# Patient Record
(demographics unavailable — no encounter records)

---

## 2024-12-10 NOTE — ASSESSMENT
[FreeTextEntry1] : Hypertension= blood pressure in good range Continue losartan HCTZ  Watch salt in diet Exercise/weight control  Hyperlipidemia Lipid profile sent today with LFTs Continue daily rosuvastatin Low-fat diet Exercise/weight control  Hypothyroidism On daily Synthroid Check TFTs today  Low vitamin B12 level On daily oral supplementation Repeat level today  Complains of clogged sensation in right ear Exam normal Suspect ETD Flonase and antihistamine as needed ENT follow-up  She will return in follow-up in 6 months for her annual physical and as needed She will call if her status worsens/changes or for any medical issues and return to be seen immediately All of the above was discussed in detail with the patient and all of her questions were addressed and answered She verbally confirmed understanding of all of the above and agreement with the above plan Labs sent today

## 2024-12-10 NOTE — PHYSICAL EXAM
[No Acute Distress] : no acute distress [Well Nourished] : well nourished [Well Developed] : well developed [Well-Appearing] : well-appearing [Normal Voice/Communication] : normal voice/communication [Normal Sclera/Conjunctiva] : normal sclera/conjunctiva [PERRL] : pupils equal round and reactive to light [EOMI] : extraocular movements intact [Normal Outer Ear/Nose] : the outer ears and nose were normal in appearance [Normal Oropharynx] : the oropharynx was normal [Normal TMs] : both tympanic membranes were normal [No JVD] : no jugular venous distention [Supple] : supple [No Respiratory Distress] : no respiratory distress  [No Accessory Muscle Use] : no accessory muscle use [Clear to Auscultation] : lungs were clear to auscultation bilaterally [Normal Rate] : normal rate  [Regular Rhythm] : with a regular rhythm [Normal S1, S2] : normal S1 and S2 [No Edema] : there was no peripheral edema [No Extremity Clubbing/Cyanosis] : no extremity clubbing/cyanosis [Declined Breast Exam] : declined breast exam  [Soft] : abdomen soft [Normal Bowel Sounds] : normal bowel sounds [Declined Rectal Exam] : declined rectal exam [No CVA Tenderness] : no CVA  tenderness [No Spinal Tenderness] : no spinal tenderness [Grossly Normal Strength/Tone] : grossly normal strength/tone [No Rash] : no rash [No Focal Deficits] : no focal deficits [Normal Gait] : normal gait [Speech Grossly Normal] : speech grossly normal [Normal Affect] : the affect was normal [Alert and Oriented x3] : oriented to person, place, and time [de-identified] : No sinus tenderness [de-identified] : No stridor [de-identified] : R = 16 [de-identified] : Normal bilateral radial pulses; no cords [de-identified] : Declined

## 2024-12-10 NOTE — REVIEW OF SYSTEMS
[Vision Problems] : vision problems [Earache] : earache [Nasal Discharge] : nasal discharge [Heartburn] : heartburn [Incontinence] : incontinence [Dizziness] : dizziness [Anxiety] : anxiety [Negative] : Heme/Lymph

## 2024-12-10 NOTE — HEALTH RISK ASSESSMENT
[No] : In the past 12 months have you used drugs other than those required for medical reasons? No [No falls in past year] : Patient reported no falls in the past year [Patient refused screening] : Patient refused screening [Never] : Never [de-identified] : Exercises daily [de-identified] : Regular

## 2024-12-10 NOTE — HISTORY OF PRESENT ILLNESS
[FreeTextEntry8] : Comes in today for routine 6-month follow-up office visit. Feeling well. Only complaint is that occasionally her right ear feels clogged. No other complaints today. Wants labs done.

## 2024-12-30 NOTE — HISTORY OF PRESENT ILLNESS
[FreeTextEntry8] : Ms. GOMEZ is a 73 year old female who presents to the office for an acute visit due to a complaint of cold like symptoms: Symptoms began Saturday - started with cough - non productive Then symptoms moved into sinuses - has severe congestion, rhinorrhea, sinus pressure and pain Associated with subjective chills but no fever  Has hx of eustachian tube dysfunction Ears feel normal - no pain but some pressure No obvious sick contacts  Taking Mucinex, Afrin  Allergy to penicillin  Start Azelastine for nasal congestion and hx of ET dysfunction Start Cefuroxime as prescribed

## 2025-01-13 NOTE — HISTORY OF PRESENT ILLNESS
[FreeTextEntry8] : Comes in for acute medical visit. Reports that she recently tested positive for COVID.  She was also treated with a course of low-dose cefuroxime for sinusitis with improvement in her symptoms. Over the weekend developed dysuria and hematuria.  Went to urgent care.  Very upset that she went to urgent care as her prior PCP had advised her to never go to urgent care for treatment.  Told that she had a UTI and started on Macrobid 100 mg twice daily for 5 days. No longer has hematuria.  Denies any fevers or chills.  Denies any nausea or vomiting.  Denies any flank pain or abdominal pain.  Dysuria has abated. Now has new upper respiratory process.  She is extremely anxious that her sinusitis has recurred.  Denies any sore throat.  Denies any ear pain.  Had significant rhinitis.  This morning had scant yellowish discharge from 1 nostril.  Has postnasal drip.  Denies any fevers or chills.  Denies any chest pain or shortness of breath or cough.  Denies any hemoptysis.  Denies any myalgias.

## 2025-01-13 NOTE — PHYSICAL EXAM
[No Acute Distress] : no acute distress [Well Nourished] : well nourished [Well Developed] : well developed [Well-Appearing] : well-appearing [Normal Voice/Communication] : normal voice/communication [Normal Sclera/Conjunctiva] : normal sclera/conjunctiva [PERRL] : pupils equal round and reactive to light [EOMI] : extraocular movements intact [Normal Outer Ear/Nose] : the outer ears and nose were normal in appearance [Normal Oropharynx] : the oropharynx was normal [Normal TMs] : both tympanic membranes were normal [No JVD] : no jugular venous distention [Supple] : supple [No Respiratory Distress] : no respiratory distress  [No Accessory Muscle Use] : no accessory muscle use [Clear to Auscultation] : lungs were clear to auscultation bilaterally [Normal Rate] : normal rate  [Regular Rhythm] : with a regular rhythm [Normal S1, S2] : normal S1 and S2 [No Edema] : there was no peripheral edema [No Extremity Clubbing/Cyanosis] : no extremity clubbing/cyanosis [Declined Breast Exam] : declined breast exam  [Soft] : abdomen soft [Normal Bowel Sounds] : normal bowel sounds [Declined Rectal Exam] : declined rectal exam [No CVA Tenderness] : no CVA  tenderness [No Spinal Tenderness] : no spinal tenderness [Grossly Normal Strength/Tone] : grossly normal strength/tone [No Rash] : no rash [No Focal Deficits] : no focal deficits [Normal Gait] : normal gait [Speech Grossly Normal] : speech grossly normal [Normal Affect] : the affect was normal [Alert and Oriented x3] : oriented to person, place, and time [de-identified] : No sinus tenderness [de-identified] : No stridor [de-identified] : R = 16 [de-identified] : Normal bilateral radial pulses; no cords [de-identified] : Declined

## 2025-01-13 NOTE — HEALTH RISK ASSESSMENT
[Intercurrent Urgi Care visits] : went to urgent care [No] : In the past 12 months have you used drugs other than those required for medical reasons? No [No falls in past year] : Patient reported no falls in the past year [Patient refused screening] : Patient refused screening [Never] : Never [de-identified] : Exercises [de-identified] : Regular

## 2025-01-13 NOTE — HEALTH RISK ASSESSMENT
[Intercurrent Urgi Care visits] : went to urgent care [No] : In the past 12 months have you used drugs other than those required for medical reasons? No [No falls in past year] : Patient reported no falls in the past year [Patient refused screening] : Patient refused screening [Never] : Never [de-identified] : Exercises [de-identified] : Regular

## 2025-01-13 NOTE — ASSESSMENT
[FreeTextEntry1] : Recent acute UTI Seen in urgent care and started on Macrobid Clinically improved with resolution of hematuria and improved dysuria Follow-up urine culture results done at urgent care Point-of-care testing urinalysis done today = discussed with patient Urine sent for urinalysis with micro and urine culture Complete course of Macrobid Keep well-hydrated Monitor fever curve  Acute URI Unclear if acute sinusitis RVP sent ENT follow-up Rest Keep well-hydrated Monitor fever curve Gargles/lozenges/warm fluids for any sore throat symptoms Apply warm compresses to sinuses Use steam therapy/humidifier Saline nasal rinses Flonase twice daily OTC cold prep Prescription for cefuroxime 500 mg twice daily sent to pharmacy should her status worsen  She can return in follow-up as needed She will call if her status does not improve or worsens or for any medical issues and return to be seen immediately All of the above was discussed with her in detail All of her questions were addressed and answered Having severe anxiety today = reassured She verbally confirmed understanding of all of the above and agreement with the above plan

## 2025-01-13 NOTE — REVIEW OF SYSTEMS
[Vision Problems] : vision problems [Nasal Discharge] : nasal discharge [Postnasal Drip] : postnasal drip [Heartburn] : heartburn [Dysuria] : dysuria [Hematuria] : hematuria [Dizziness] : dizziness [Anxiety] : anxiety [Negative] : Heme/Lymph

## 2025-01-13 NOTE — PHYSICAL EXAM
[No Acute Distress] : no acute distress [Well Nourished] : well nourished [Well Developed] : well developed [Well-Appearing] : well-appearing [Normal Voice/Communication] : normal voice/communication [Normal Sclera/Conjunctiva] : normal sclera/conjunctiva [PERRL] : pupils equal round and reactive to light [EOMI] : extraocular movements intact [Normal Outer Ear/Nose] : the outer ears and nose were normal in appearance [Normal Oropharynx] : the oropharynx was normal [Normal TMs] : both tympanic membranes were normal [No JVD] : no jugular venous distention [Supple] : supple [No Respiratory Distress] : no respiratory distress  [No Accessory Muscle Use] : no accessory muscle use [Clear to Auscultation] : lungs were clear to auscultation bilaterally [Normal Rate] : normal rate  [Regular Rhythm] : with a regular rhythm [Normal S1, S2] : normal S1 and S2 [No Edema] : there was no peripheral edema [No Extremity Clubbing/Cyanosis] : no extremity clubbing/cyanosis [Declined Breast Exam] : declined breast exam  [Soft] : abdomen soft [Normal Bowel Sounds] : normal bowel sounds [Declined Rectal Exam] : declined rectal exam [No CVA Tenderness] : no CVA  tenderness [No Spinal Tenderness] : no spinal tenderness [Grossly Normal Strength/Tone] : grossly normal strength/tone [No Rash] : no rash [No Focal Deficits] : no focal deficits [Normal Gait] : normal gait [Speech Grossly Normal] : speech grossly normal [Normal Affect] : the affect was normal [Alert and Oriented x3] : oriented to person, place, and time [de-identified] : No sinus tenderness [de-identified] : No stridor [de-identified] : R = 16 [de-identified] : Normal bilateral radial pulses; no cords [de-identified] : Declined

## 2025-02-04 NOTE — HISTORY OF PRESENT ILLNESS
[FreeTextEntry1] : 73-year-old female who presents for UTI  Went to urgent care 1/11/2025 - had gross hematuria and increased frequency / urgency. No dysuria. Was told had UTI - given macrobid x 5d UCx: >100k CFU e. coli (HIE) She then saw her PCP several days later, and a urine culture was obtained which was negative   She given back to her PCP approximately 2 weeks later.  A urine culture was obtained.  This ultimately resulted in being positive, however, that weekend she again presented to urgent care and a urine culture was obtained that was positive for E. coli. She was again given macrobid x 5d Now symptom free  RBUS 1/2025 - no stones, hydronephrosis   Did have a UTI ~10 ago, none since  Normally, does report nocturia x3. 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

## 2025-02-04 NOTE — ASSESSMENT
[FreeTextEntry1] : 73 y.o. F with UTI and hematuria in setting of UTI - UA, UCx, urine cytology - Return for cystoscopy given gross hematuria - Renal bladder ultrasound reviewed, no stones, hydronephrosis, bladder wall thickness - Recommend increasing hydration to 2 L a day to prevent urinary tract infections - While there is little evidence to support the reduction of UTIs through behavioral changes such as voiding after intercourse, wiping from front to back, avoiding hot tubs, good general hygiene is always encouraged.  - Cranberry w/ PAC - provided names of brands - Discussed vaginal estrogen pending pelvic exam / cystoscopy findings. NO history of breast / endometrial CA - PVR 26 mL - DID provided 5d of macrobid for patient if she is traveling / abroad / weekend if she feels another UTI to self-start. Encouraged her to call office first if she feels a new UTI

## 2025-02-04 NOTE — PHYSICAL EXAM
[Normal Appearance] : normal appearance [Edema] : no peripheral edema [] : no respiratory distress [Bowel Sounds] : normal bowel sounds [Abdomen Tenderness] : non-tender [Normal Station and Gait] : the gait and station were normal for the patient's age

## 2025-04-15 NOTE — REVIEW OF SYSTEMS
[Vision Problems] : vision problems [Earache] : earache [Nasal Discharge] : nasal discharge [Postnasal Drip] : postnasal drip [Heartburn] : heartburn [Dizziness] : dizziness [Anxiety] : anxiety [Negative] : Heme/Lymph

## 2025-04-15 NOTE — HISTORY OF PRESENT ILLNESS
[FreeTextEntry8] : Complains of right maxillary sinus pain and tenderness.  Feels that her face/sinuses are swollen.  Has nasal congestion with discharge and postnasal drip.  Left ear clogged with sensation of water in it.  Denies any fevers or chills.  No purulent nasal discharge.  No sore throat.  Symptoms relieved with Benadryl. Main reason for visit is that she has heard a pop or crackle in different joints.  She hears this when moving her neck, her arms, and her legs.  Not sure what is causing this and very worried about it and wants reassurance that it is nothing bad.  Reportedly googled and concerned about vitamin D deficiency.

## 2025-04-15 NOTE — PHYSICAL EXAM
[No Acute Distress] : no acute distress [Well Nourished] : well nourished [Well Developed] : well developed [Well-Appearing] : well-appearing [Normal Voice/Communication] : normal voice/communication [Normal Sclera/Conjunctiva] : normal sclera/conjunctiva [PERRL] : pupils equal round and reactive to light [EOMI] : extraocular movements intact [Normal Outer Ear/Nose] : the outer ears and nose were normal in appearance [Normal Oropharynx] : the oropharynx was normal [Normal TMs] : both tympanic membranes were normal [No JVD] : no jugular venous distention [Supple] : supple [No Respiratory Distress] : no respiratory distress  [No Accessory Muscle Use] : no accessory muscle use [Clear to Auscultation] : lungs were clear to auscultation bilaterally [Normal Rate] : normal rate  [Regular Rhythm] : with a regular rhythm [Normal S1, S2] : normal S1 and S2 [No Carotid Bruits] : no carotid bruits [No Edema] : there was no peripheral edema [No Extremity Clubbing/Cyanosis] : no extremity clubbing/cyanosis [Declined Breast Exam] : declined breast exam  [Soft] : abdomen soft [Normal Bowel Sounds] : normal bowel sounds [Declined Rectal Exam] : declined rectal exam [No CVA Tenderness] : no CVA  tenderness [No Spinal Tenderness] : no spinal tenderness [No Joint Swelling] : no joint swelling [Grossly Normal Strength/Tone] : grossly normal strength/tone [No Rash] : no rash [Coordination Grossly Intact] : coordination grossly intact [No Focal Deficits] : no focal deficits [Normal Gait] : normal gait [Speech Grossly Normal] : speech grossly normal [Memory Grossly Normal] : memory grossly normal [Alert and Oriented x3] : oriented to person, place, and time [de-identified] : ?  Right maxillary sinus tenderness [de-identified] : No stridor; free range of motion of neck = heard click with neck movement = reproduced sound of concern [de-identified] : R = 16; normal air entry [de-identified] : No cords [de-identified] : Declined [de-identified] : Extremely anxious and repetitious

## 2025-04-15 NOTE — PHYSICAL EXAM
[No Acute Distress] : no acute distress [Well Nourished] : well nourished [Well Developed] : well developed [Well-Appearing] : well-appearing [Normal Voice/Communication] : normal voice/communication [Normal Sclera/Conjunctiva] : normal sclera/conjunctiva [PERRL] : pupils equal round and reactive to light [EOMI] : extraocular movements intact [Normal Outer Ear/Nose] : the outer ears and nose were normal in appearance [Normal Oropharynx] : the oropharynx was normal [Normal TMs] : both tympanic membranes were normal [No JVD] : no jugular venous distention [Supple] : supple [No Respiratory Distress] : no respiratory distress  [No Accessory Muscle Use] : no accessory muscle use [Clear to Auscultation] : lungs were clear to auscultation bilaterally [Normal Rate] : normal rate  [Regular Rhythm] : with a regular rhythm [Normal S1, S2] : normal S1 and S2 [No Carotid Bruits] : no carotid bruits [No Edema] : there was no peripheral edema [No Extremity Clubbing/Cyanosis] : no extremity clubbing/cyanosis [Declined Breast Exam] : declined breast exam  [Soft] : abdomen soft [Normal Bowel Sounds] : normal bowel sounds [Declined Rectal Exam] : declined rectal exam [No CVA Tenderness] : no CVA  tenderness [No Spinal Tenderness] : no spinal tenderness [No Joint Swelling] : no joint swelling [Grossly Normal Strength/Tone] : grossly normal strength/tone [No Rash] : no rash [Coordination Grossly Intact] : coordination grossly intact [No Focal Deficits] : no focal deficits [Normal Gait] : normal gait [Speech Grossly Normal] : speech grossly normal [Memory Grossly Normal] : memory grossly normal [Alert and Oriented x3] : oriented to person, place, and time [de-identified] : ?  Right maxillary sinus tenderness [de-identified] : No stridor; free range of motion of neck = heard click with neck movement = reproduced sound of concern [de-identified] : R = 16; normal air entry [de-identified] : No cords [de-identified] : Declined [de-identified] : Extremely anxious and repetitious

## 2025-04-15 NOTE — HEALTH RISK ASSESSMENT
[No] : In the past 12 months have you used drugs other than those required for medical reasons? No [No falls in past year] : Patient reported no falls in the past year [Patient refused screening] : Patient refused screening [de-identified] : Urology [de-identified] : Exercises [de-identified] : Regular [Never] : Never

## 2025-04-15 NOTE — HEALTH RISK ASSESSMENT
[No] : In the past 12 months have you used drugs other than those required for medical reasons? No [No falls in past year] : Patient reported no falls in the past year [Patient refused screening] : Patient refused screening [de-identified] : Urology [de-identified] : Exercises [de-identified] : Regular [Never] : Never

## 2025-04-15 NOTE — ASSESSMENT
[FreeTextEntry1] : Reports cracking or popping sound upon moving her neck and extremities Free range of motion without pain of her neck = clicking sound noted and is consistent with sounds of concern for which she presents today Advised the patient that the sounds are joint crackles that occur with age and joint degeneration/arthritis Can start with films of cervical spine to evaluate for significant cervical spine disease Will hold on other x-rays/imaging for now Can see rheumatology for evaluation of osteoarthritis Labs and serologies below sent Reassurance provided Can consider using glucosamine and chondroitin or turmeric or fish oil to improve joint health and provide lubrication and anti-inflammatory effect Can consider PT  Sinus disease in setting of acute URI versus active spring seasonal allergies/allergic rhinitis RVP sent Rest Fluids Monitor fever curve Will need antibiotic therapy if febrile or if signs of sinusitis with purulent discharge Can follow-up with ENT Gargles/lozenges/warm fluids Apply warm compresses to sinuses Use steam therapy or humidifier Saline nasal rinses Can use Flonase and antihistamine daily as needed  She wishes to return in follow-up for her annual physical and as needed She will call if her status worsens/changes/does not improve and return to be seen immediately She will call for any medical issues and return to be seen immediately All of the above was discussed in detail with the patient and all of her questions were addressed and answered The above was reviewed with her several times and reassurance was provided She verbally confirmed understanding of all of the above and agreement with the above plan

## 2025-06-12 NOTE — ASSESSMENT
[Vaccines Reviewed] : Immunizations reviewed today. Please see immunization details in the vaccine log within the immunization flowsheet.  [FreeTextEntry1] : See health maintenance assessment and plan outlined below. In addition: Full labs/ and urine testing done today Does bone density as per endocrine =2024 Endocrine f/u 2025 Vascular follow-up 2025 as needed Orthopedic follow-up 2025 as needed F/U with Rheum as needed 2025 She adamantly refuses to do colonoscopy, Cologuard testing, or even FIT testing 2025 Saw GYN 2024 = to set up follow-up appointment 2025 Had mammograms 2025 Urology f/u as needed 2025 Continue meds, diet, exercise as outlined See scanned EKG done here today = report reviewed with patient Had CXR 2018 = declines 2025 ENT follow-up 2025 Vaccines reviewed and declined To f/u with derm, ophtho, dentist 2025 Consider psych follow-up for anxiety control RTC for annual physical She wishes to return in follow-up in 6 months and as needed To call for any medical issues or if her status worsens or changes and to return to be seen immediately All of the above was discussed in detail with the patient and all of her questions were answered She verbally confirmed understanding of all of the above and agreement with the above plan Reassurance was provided

## 2025-06-12 NOTE — HEALTH RISK ASSESSMENT
[Very Good] : ~his/her~  mood as very good [Intercurrent Urgi Care visits] : went to urgent care [No] : In the past 12 months have you used drugs other than those required for medical reasons? No [No falls in past year] : Patient reported no falls in the past year [Little interest or pleasure doing things] : 1) Little interest or pleasure doing things [Feeling down, depressed, or hopeless] : 2) Feeling down, depressed, or hopeless [0] : 2) Feeling down, depressed, or hopeless: Not at all (0) [PHQ-2 Negative - No further assessment needed] : PHQ-2 Negative - No further assessment needed [PHQ-9 Negative - No further assessment needed] : PHQ-9 Negative - No further assessment needed [None] : Patient does not have any barriers to medication adherence [Yes] : Reviewed medication list for presence of high-risk medications. [Opioids] : opioids [Never] : Never [NO] : No [No Retinopathy] : No retinopathy [Patient declined colonoscopy] : Patient declined colonoscopy [HIV test declined] : HIV test declined [Hepatitis C test declined] : Hepatitis C test declined [Alone] : lives alone [# of Members in Household ___] :  household currently consist of [unfilled] member(s) [Retired] : retired [Graduate School] : graduate school [] :  [# Of Children ___] : has [unfilled] children [Feels Safe at Home] : Feels safe at home [Fully functional (bathing, dressing, toileting, transferring, walking, feeding)] : Fully functional (bathing, dressing, toileting, transferring, walking, feeding) [Fully functional (using the telephone, shopping, preparing meals, housekeeping, doing laundry, using] : Fully functional and needs no help or supervision to perform IADLs (using the telephone, shopping, preparing meals, housekeeping, doing laundry, using transportation, managing medications and managing finances) [Reports changes in vision] : Reports changes in vision [Reports changes in dental health] : Reports changes in dental health [Smoke Detector] : smoke detector [Carbon Monoxide Detector] : carbon monoxide detector [Seat Belt] :  uses seat belt [Sunscreen] : uses sunscreen [With Patient/Caregiver] : , with patient/caregiver [Designated Healthcare Proxy] : Designated healthcare proxy [Name: ___] : Health Care Proxy's Name: [unfilled]  [Relationship: ___] : Relationship: [unfilled] [de-identified] : ophtho, dentist, derm, urology, endocrine [de-identified] : exercises  [de-identified] : regular [[id number]] : 0 [EyeExamDate] : 2025 [Change in mental status noted] : No change in mental status noted [Reports changes in hearing] : Reports no changes in hearing [Travel to Developing Areas] : does not  travel to developing areas [TB Exposure] : is not being exposed to tuberculosis [Caregiver Concerns] : does not have caregiver concerns [MammogramDate] : 04/25 [PapSmearDate] : 10/24 [BoneDensityDate] : 2024 [ColonoscopyDate] : 2010 [de-identified] : 3 implants [AdvancecareDate] : 06/25 [FreeTextEntry4] : [phone]

## 2025-06-12 NOTE — HISTORY OF PRESENT ILLNESS
[FreeTextEntry1] : Comes in for annual physical. [de-identified] : Feeling well. Had covid with no residual effects.

## 2025-06-12 NOTE — PHYSICAL EXAM
[No Acute Distress] : no acute distress [Well Nourished] : well nourished [Well Developed] : well developed [Well-Appearing] : well-appearing [Normal Voice/Communication] : normal voice/communication [Normal Sclera/Conjunctiva] : normal sclera/conjunctiva [PERRL] : pupils equal round and reactive to light [EOMI] : extraocular movements intact [Normal Outer Ear/Nose] : the outer ears and nose were normal in appearance [Normal Oropharynx] : the oropharynx was normal [Normal TMs] : both tympanic membranes were normal [No JVD] : no jugular venous distention [Supple] : supple [No Lymphadenopathy] : no lymphadenopathy [No Respiratory Distress] : no respiratory distress  [Clear to Auscultation] : lungs were clear to auscultation bilaterally [No Accessory Muscle Use] : no accessory muscle use [Normal Rate] : normal rate  [Regular Rhythm] : with a regular rhythm [Normal S1, S2] : normal S1 and S2 [No Carotid Bruits] : no carotid bruits [Pedal Pulses Present] : the pedal pulses are present [No Edema] : there was no peripheral edema [No Extremity Clubbing/Cyanosis] : no extremity clubbing/cyanosis [Normal Appearance] : normal in appearance [No Nipple Discharge] : no nipple discharge [No Axillary Lymphadenopathy] : no axillary lymphadenopathy [Soft] : abdomen soft [Non Tender] : non-tender [Non-distended] : non-distended [No Masses] : no abdominal mass palpated [No HSM] : no HSM [Normal Bowel Sounds] : normal bowel sounds [Declined Rectal Exam] : declined rectal exam [Normal Supraclavicular Nodes] : no supraclavicular lymphadenopathy [Normal Axillary Nodes] : no axillary lymphadenopathy [Normal Posterior Cervical Nodes] : no posterior cervical lymphadenopathy [Normal Anterior Cervical Nodes] : no anterior cervical lymphadenopathy [No CVA Tenderness] : no CVA  tenderness [No Spinal Tenderness] : no spinal tenderness [Grossly Normal Strength/Tone] : grossly normal strength/tone [No Rash] : no rash [Normal Gait] : normal gait [Coordination Grossly Intact] : coordination grossly intact [No Focal Deficits] : no focal deficits [Speech Grossly Normal] : speech grossly normal [Normal Affect] : the affect was normal [Alert and Oriented x3] : oriented to person, place, and time [Memory Grossly Normal] : memory grossly normal [Normal Mood] : the mood was normal [Normal Insight/Judgement] : insight and judgment were intact [de-identified] : No ST [de-identified] : Prominent thyroid; no stridor [de-identified] : R=16 [de-identified] : normal radial pulses; no cords [de-identified] : as per GYN

## 2025-06-12 NOTE — REVIEW OF SYSTEMS
[Vision Problems] : vision problems [Heartburn] : heartburn [Incontinence] : incontinence [Dizziness] : dizziness [Anxiety] : anxiety [Negative] : Heme/Lymph [Joint Stiffness] : joint stiffness